# Patient Record
Sex: FEMALE | Race: BLACK OR AFRICAN AMERICAN | NOT HISPANIC OR LATINO | Employment: FULL TIME | URBAN - METROPOLITAN AREA
[De-identification: names, ages, dates, MRNs, and addresses within clinical notes are randomized per-mention and may not be internally consistent; named-entity substitution may affect disease eponyms.]

---

## 2023-08-23 ENCOUNTER — HOSPITAL ENCOUNTER (OUTPATIENT)
Dept: DATA CONVERSION | Facility: HOSPITAL | Age: 23
Discharge: HOME | End: 2023-08-23

## 2023-08-23 DIAGNOSIS — N76.0 ACUTE VAGINITIS: ICD-10-CM

## 2023-08-24 LAB
C GLABRATA DNA VAG QL NAA+PROBE: NEGATIVE
C KRUSEI DNA VAG QL NAA+PROBE: NEGATIVE
CANDIDA DNA VAG QL PROBE+SIG AMP: NEGATIVE
T VAGINALIS DNA VAG QL PROBE+SIG AMP: NEGATIVE
VAGINOSIS/ITIS DNA PNL VAG PROBE+SIG AMP: NEGATIVE

## 2023-09-25 ENCOUNTER — HOSPITAL ENCOUNTER (OUTPATIENT)
Dept: DATA CONVERSION | Facility: HOSPITAL | Age: 23
Discharge: HOME | End: 2023-09-25

## 2023-09-25 DIAGNOSIS — Z01.419 ENCOUNTER FOR GYNECOLOGICAL EXAMINATION (GENERAL) (ROUTINE) WITHOUT ABNORMAL FINDINGS: ICD-10-CM

## 2023-09-26 ENCOUNTER — HOSPITAL ENCOUNTER (OUTPATIENT)
Dept: DATA CONVERSION | Facility: HOSPITAL | Age: 23
Discharge: HOME | End: 2023-09-26

## 2023-09-26 DIAGNOSIS — R31.9 HEMATURIA, UNSPECIFIED: ICD-10-CM

## 2023-09-26 LAB
BACTERIA SPEC CULT: NORMAL
CC # UR: NORMAL /UL
REPORT STATUS -LH SQ DATA CONVERSION: NORMAL
SERVICE CMNT-IMP: NORMAL
SPECIMEN SOURCE: NORMAL

## 2023-10-04 ENCOUNTER — TELEPHONE (OUTPATIENT)
Dept: PRIMARY CARE | Facility: CLINIC | Age: 23
End: 2023-10-04

## 2023-10-04 NOTE — TELEPHONE ENCOUNTER
Pt needs tdap testing done for school, okay to schedule with MA?  Pt also needs bloodwork records   [phone]

## 2023-10-04 NOTE — TELEPHONE ENCOUNTER
Spoke with patient at this time, she has not had her rubella titer drawn, she will do so at this time. Also per last note from [facility] patient OK for tdap booster on [facility] schedule. Patient states will call back to schedule boosters after her titer level is resulted.

## 2024-01-09 ENCOUNTER — LAB REQUISITION (OUTPATIENT)
Dept: LAB | Facility: HOSPITAL | Age: 24
End: 2024-01-09
Payer: MEDICAID

## 2024-01-09 DIAGNOSIS — R39.9 UNSPECIFIED SYMPTOMS AND SIGNS INVOLVING THE GENITOURINARY SYSTEM: ICD-10-CM

## 2024-01-09 PROCEDURE — 87086 URINE CULTURE/COLONY COUNT: CPT

## 2024-01-11 LAB — BACTERIA UR CULT: ABNORMAL

## 2024-01-29 ENCOUNTER — OFFICE VISIT (OUTPATIENT)
Dept: PRIMARY CARE | Facility: CLINIC | Age: 24
End: 2024-01-29
Payer: MEDICAID

## 2024-01-29 VITALS
SYSTOLIC BLOOD PRESSURE: 122 MMHG | HEIGHT: 66 IN | DIASTOLIC BLOOD PRESSURE: 78 MMHG | TEMPERATURE: 97.3 F | OXYGEN SATURATION: 98 % | BODY MASS INDEX: 30.53 KG/M2 | WEIGHT: 190 LBS | HEART RATE: 92 BPM

## 2024-01-29 DIAGNOSIS — L50.8 OTHER URTICARIA: Primary | ICD-10-CM

## 2024-01-29 PROCEDURE — 1036F TOBACCO NON-USER: CPT | Performed by: NURSE PRACTITIONER

## 2024-01-29 PROCEDURE — 99213 OFFICE O/P EST LOW 20 MIN: CPT | Performed by: NURSE PRACTITIONER

## 2024-01-29 RX ORDER — METHYLPREDNISOLONE 4 MG/1
TABLET ORAL
Qty: 21 TABLET | Refills: 0 | Status: SHIPPED | OUTPATIENT
Start: 2024-01-29 | End: 2024-02-05

## 2024-01-29 ASSESSMENT — PAIN SCALES - GENERAL: PAINLEVEL: 0-NO PAIN

## 2024-01-29 ASSESSMENT — PATIENT HEALTH QUESTIONNAIRE - PHQ9
1. LITTLE INTEREST OR PLEASURE IN DOING THINGS: NOT AT ALL
SUM OF ALL RESPONSES TO PHQ9 QUESTIONS 1 AND 2: 0
2. FEELING DOWN, DEPRESSED OR HOPELESS: NOT AT ALL

## 2024-01-29 NOTE — PROGRESS NOTES
"Subjective   Patient ID: Landy Dubois is a 23 y.o. female who presents for Hives (X 5 days).    HPI   Pt has had hives and itching to entire body  that began 4 days ago.   Did not feel well last week with sore throat/congestion.   Home COVID19 tests were negative.   No new soaps/lotions/detergents  No new medications  No medications at this time  LMP 1/22/24    Review of Systems  ROS negative with exceptions above    Objective   /78 (BP Location: Left arm)   Pulse 92   Temp 36.3 °C (97.3 °F) (Temporal)   Ht 1.676 m (5' 6\")   Wt 86.2 kg (190 lb)   SpO2 98%   BMI 30.67 kg/m²     Physical Exam  Alert and oriented x 3, no acute distress  HEENT normal  Neck supple without lymphadenopathy  Lungs clear to auscultation bilaterally  Heart with regular rate and rhythm  Skin warm and dry. Scattered papules with surrounding redness to trunk and extremities. No drainage, crusting. Pt itching.   Neuro grossly intact    Assessment/Plan   Diagnoses and all orders for this visit:  Other urticaria  -     methylPREDNISolone (Medrol Dospak) 4 mg tablets; Take as directed on package.  Differentials discussed  Likely urticaria after viral illness based on symptomology  Meds as discussed  Ice packs, cool compresses for itching  Follow up INB 7 days     "

## 2024-03-12 ENCOUNTER — TELEPHONE (OUTPATIENT)
Dept: PRIMARY CARE | Facility: CLINIC | Age: 24
End: 2024-03-12
Payer: MEDICAID

## 2024-03-12 NOTE — TELEPHONE ENCOUNTER
PT THINKS SHE HAS A TOOTH INFECTION AND SAID KASSANDRA HAS CALLED IN [facility] FOR HER BEFORE.  SHE DID SEE A DENTIST TODAY, THEY DID NOT GIVE HER ANYTHING.  SHE WANTS KASSANDRA TO CALL HER IN SOMETHING . OMERO LEVINE

## 2024-05-17 ENCOUNTER — TELEPHONE (OUTPATIENT)
Dept: PRIMARY CARE | Facility: CLINIC | Age: 24
End: 2024-05-17
Payer: MEDICAID

## 2024-06-06 ENCOUNTER — OFFICE VISIT (OUTPATIENT)
Dept: PRIMARY CARE | Facility: CLINIC | Age: 24
End: 2024-06-06
Payer: MEDICAID

## 2024-06-06 VITALS
BODY MASS INDEX: 31.31 KG/M2 | SYSTOLIC BLOOD PRESSURE: 124 MMHG | OXYGEN SATURATION: 99 % | WEIGHT: 194 LBS | DIASTOLIC BLOOD PRESSURE: 80 MMHG | TEMPERATURE: 98.3 F | HEART RATE: 88 BPM

## 2024-06-06 DIAGNOSIS — N89.8 VAGINAL IRRITATION: ICD-10-CM

## 2024-06-06 LAB
POC APPEARANCE, URINE: CLEAR
POC BILIRUBIN, URINE: NEGATIVE
POC BLOOD, URINE: ABNORMAL
POC COLOR, URINE: YELLOW
POC GLUCOSE, URINE: NEGATIVE MG/DL
POC KETONES, URINE: NEGATIVE MG/DL
POC LEUKOCYTES, URINE: NEGATIVE
POC NITRITE,URINE: NEGATIVE
POC PH, URINE: 6.5 PH
POC PROTEIN, URINE: NEGATIVE MG/DL
POC SPECIFIC GRAVITY, URINE: <=1.005
POC UROBILINOGEN, URINE: 0.2 EU/DL

## 2024-06-06 PROCEDURE — 81003 URINALYSIS AUTO W/O SCOPE: CPT | Performed by: NURSE PRACTITIONER

## 2024-06-06 PROCEDURE — 87491 CHLMYD TRACH DNA AMP PROBE: CPT

## 2024-06-06 PROCEDURE — 87529 HSV DNA AMP PROBE: CPT

## 2024-06-06 PROCEDURE — 87205 SMEAR GRAM STAIN: CPT

## 2024-06-06 PROCEDURE — 99213 OFFICE O/P EST LOW 20 MIN: CPT | Performed by: NURSE PRACTITIONER

## 2024-06-06 PROCEDURE — 87591 N.GONORRHOEAE DNA AMP PROB: CPT

## 2024-06-06 RX ORDER — LAMOTRIGINE 25 MG/1
25 TABLET ORAL DAILY
COMMUNITY
Start: 2024-06-03

## 2024-06-06 RX ORDER — BUPROPION HYDROCHLORIDE 300 MG/1
300 TABLET ORAL EVERY MORNING
COMMUNITY
Start: 2023-08-23

## 2024-06-06 ASSESSMENT — PAIN SCALES - GENERAL: PAINLEVEL: 2

## 2024-06-06 NOTE — PROGRESS NOTES
Subjective   Patient ID: Landy Dubois is a 24 y.o. female who presents for STD testing (Red bumps on vagina, itching, and painful. ).    HPI   Landy is a 23 yo F who complains of bumps in vaginal area. She is concerned she could have an STI based on troubles in her marriage. She does shave that area and gets ingrown hairs which she plucks.   She states area is itchy and painful. No otc used    Review of Systems   Constitutional: Negative.    Gastrointestinal: Negative.    Genitourinary:  Positive for genital sores. Negative for decreased urine volume, difficulty urinating, dysuria, frequency, hematuria, pelvic pain, vaginal bleeding, vaginal discharge and vaginal pain.       Objective   /80   Pulse 88   Temp 36.8 °C (98.3 °F)   Wt 88 kg (194 lb)   LMP 06/03/2024 (Exact Date)   SpO2 99%   BMI 31.31 kg/m²     Physical Exam  Alert and oriented x 3, no acute distress  Lungs clear to auscultation bilaterally  Heart with regular rate and rhythm  ABD soft, nontender, nondistended without masses.  No CVAT  Patient with small red papules to mons pubis and left labia majora.  There is no crusting or drainage.  Introitus normal without discharge.  Vaginal vault clear.  No CMT.  No adnexal tenderness or masses.  Skin warm and dry without rash neuro grossly    Assessment/Plan   Diagnoses and all orders for this visit:  Vaginal irritation  -     POCT UA Automated manually resulted  -     C. Trachomatis / N. Gonorrhoeae, Amplified Detection; Future  -     Vaginitis Gram Stain For Bacterial Vaginosis + Yeast  -     HSV PCR  Acute-needs better control  Urine dip normal  Due to complaints and STI concerns, we will do a GC and chlamydia check, vaginitis screen and viral culture.  Patient is encouraged vaginal hygiene  Avoid vaginal irritants  Await results.  Differentials include folliculitis, STI and lower EXTR  Advised to avoid shaving area at this time.  Follow-up based on results

## 2024-06-07 LAB
CLUE CELLS VAG LPF-#/AREA: NORMAL /[LPF]
HSV1 DNA SKIN QL NAA+PROBE: NOT DETECTED
HSV2 DNA SKIN QL NAA+PROBE: NOT DETECTED
NUGENT SCORE: 2
YEAST VAG WET PREP-#/AREA: NORMAL

## 2024-06-08 LAB
C TRACH RRNA SPEC QL NAA+PROBE: NEGATIVE
N GONORRHOEA DNA SPEC QL PROBE+SIG AMP: NEGATIVE

## 2024-06-10 DIAGNOSIS — L73.9 FOLLICULITIS: Primary | ICD-10-CM

## 2024-06-10 RX ORDER — SULFAMETHOXAZOLE AND TRIMETHOPRIM 800; 160 MG/1; MG/1
1 TABLET ORAL 2 TIMES DAILY
Qty: 14 TABLET | Refills: 0 | Status: SHIPPED | OUTPATIENT
Start: 2024-06-10 | End: 2024-06-17

## 2024-06-15 ASSESSMENT — ENCOUNTER SYMPTOMS
CONSTITUTIONAL NEGATIVE: 1
DIFFICULTY URINATING: 0
HEMATURIA: 0
GASTROINTESTINAL NEGATIVE: 1
DYSURIA: 0
FREQUENCY: 0

## 2024-07-02 PROBLEM — E66.9 OBESITY WITH BODY MASS INDEX 30 OR GREATER: Status: ACTIVE | Noted: 2024-07-02

## 2024-07-02 PROBLEM — M22.2X1 PATELLOFEMORAL DISORDERS, RIGHT KNEE: Status: ACTIVE | Noted: 2024-07-02

## 2024-07-02 PROBLEM — F43.21 ADJUSTMENT DISORDER WITH DEPRESSED MOOD: Status: ACTIVE | Noted: 2021-05-25

## 2024-07-02 PROBLEM — K60.2 ANAL FISSURE: Status: RESOLVED | Noted: 2024-07-02 | Resolved: 2024-07-02

## 2024-07-02 PROBLEM — R42 VERTIGO: Status: ACTIVE | Noted: 2024-07-02

## 2024-07-02 PROBLEM — M22.2X2 PATELLOFEMORAL DISORDER OF LEFT KNEE: Status: ACTIVE | Noted: 2024-07-02

## 2024-07-02 PROBLEM — G43.909 MIGRAINES: Status: ACTIVE | Noted: 2024-07-02

## 2024-07-02 PROBLEM — E04.1 NODULAR THYROID DISEASE: Status: ACTIVE | Noted: 2024-07-02

## 2024-07-02 PROBLEM — K12.0 RECURRENT APHTHOUS ULCER: Status: ACTIVE | Noted: 2022-07-28

## 2024-07-02 PROBLEM — O60.00 PREMATURE LABOR (HHS-HCC): Status: RESOLVED | Noted: 2024-07-02 | Resolved: 2024-07-02

## 2024-07-02 PROBLEM — F33.2 MDD (MAJOR DEPRESSIVE DISORDER), RECURRENT SEVERE, WITHOUT PSYCHOSIS (MULTI): Chronic | Status: ACTIVE | Noted: 2022-12-13

## 2024-07-02 PROBLEM — R56.9 SEIZURE (MULTI): Status: RESOLVED | Noted: 2024-07-02 | Resolved: 2024-07-02

## 2024-07-02 PROBLEM — Q66.6 VALGUS DEFORMITY OF BOTH FEET: Status: ACTIVE | Noted: 2024-07-02

## 2024-07-02 PROBLEM — E55.9 VITAMIN D DEFICIENCY: Status: ACTIVE | Noted: 2023-06-16

## 2024-07-02 PROBLEM — F41.8 SITUATIONAL ANXIETY: Status: ACTIVE | Noted: 2023-07-01

## 2024-07-02 PROBLEM — R41.82 ALTERED MENTAL STATUS: Status: RESOLVED | Noted: 2024-07-02 | Resolved: 2024-07-02

## 2024-07-03 ENCOUNTER — APPOINTMENT (OUTPATIENT)
Dept: PRIMARY CARE | Facility: CLINIC | Age: 24
End: 2024-07-03
Payer: MEDICAID

## 2024-07-03 VITALS
WEIGHT: 191 LBS | SYSTOLIC BLOOD PRESSURE: 110 MMHG | OXYGEN SATURATION: 98 % | DIASTOLIC BLOOD PRESSURE: 70 MMHG | BODY MASS INDEX: 30.7 KG/M2 | TEMPERATURE: 98 F | HEIGHT: 66 IN | HEART RATE: 105 BPM

## 2024-07-03 DIAGNOSIS — Z00.00 ROUTINE GENERAL MEDICAL EXAMINATION AT A HEALTH CARE FACILITY: ICD-10-CM

## 2024-07-03 PROCEDURE — 99395 PREV VISIT EST AGE 18-39: CPT | Performed by: NURSE PRACTITIONER

## 2024-07-03 ASSESSMENT — PAIN SCALES - GENERAL: PAINLEVEL: 6

## 2024-07-03 NOTE — PROGRESS NOTES
"Subjective   Patient ID: Landy Dubois is a 24 y.o. female who presents for Annual Exam (Also c/o ongoing vaginal irritation-states medication she was prescribed did not help).    TABATHA Bill is a 25 yo F who presents for CPE  PMH/SMH/FMH reviewed and updated    Diet is improving, watching portions and carbs  Exercises daily  On semaglutide  Weight is down 14 lbs  Vapes  - started 2/24     GYN Dr Negro 12/23  Pap test done    Immunizations reviewed    Review of Systems  Constitutional Symptoms: negative for fever, loss of appetite, headaches, fatigue.   Eyes: negative for loss and blurring of vision, double vision.   Ear, Nose, Mouth, Throat: negative for hearing loss, tinnitus, nasal congestion, rhinorrhea, nose bleeds, teeth problems, mouth sores, gum disease, dysphagia, sore throat.   Cardiovascular: negative for chest pain/pressure, palpitations, edema, claudication.   Respiratory: negative for shortness of breath, dyspnea on exertion, pain with breathing, coughing.   Breast: negative for tenderness, masses, gynecomastia.   Gastrointestinal: negative for anorexia, indigestion, nausea, vomiting, abdominal pain, change in bowel habits, diarrhea, constipation, hematochezia, melena, blood in stool.    : Negative for urinary or genital complaints  Musculoskeletal: negative for joint pain, joint swelling, myalgia, cramps.   Integumentary: negative for change in mole, skin trouble or rash.   Neurological: negative for headache, numbness, tingling, weakness, tremors.   Psychiatric: negative for depression, anxiety.   Endocrine: negative for weight gain, heat or cold intolerance, polyuria, polydipsia, polyphagia.   Hematologic/Lymphatic: negative for bruising, abnormal bleeding, swollen glands     Objective   /70 (BP Location: Left arm, Patient Position: Sitting)   Pulse 105   Temp 36.7 °C (98 °F) (Temporal)   Ht 1.664 m (5' 5.5\")   Wt 86.6 kg (191 lb)   LMP 06/03/2024 (Exact Date)   SpO2 98%   BMI " 31.30 kg/m²     Physical Exam  General Appearance: Comfortable. She is well nourished, and well developed. She is awake, alert, and oriented and appears her stated age. The patient is cooperative with exam.  Head: Hair pattern reveals a normal pattern for patient's age and The face shows no abnormalities.  Eyes: PERRLA, EOMI, conjunctiva and sclera clear. Extraocular muscle exam reveals EOMI.  Ears, Nose, Mouth, Throat: Bilateral canals are normal. Both tympanic membranes are pearly gray and landmarks normal.    Nasal mucosa in both nostrils reveals no polyps, ulcerations, or lesions. Oral mucosa reveals no abnormalities and Teeth reveal good repair.  Neck: Neck reveals supple, no adenopathy, no thyromegaly, or carotid bruits.  Chest: Lungs are clear to auscultation bilaterally with no wheezes, rales, or rhonchi.  Cardiovascular: RRR without MRG. No edema  Breast: gyn  Abdomen: Abdomen is soft, NT, ND with no masses.  Genitourinary: gyn  Musculoskeletal: 5/5 and equal strength in bilateral upper and lower extremities.  Skin: Skin reveals good turgor and no rashes.  Neurological: Intact and non-focal. Cranial nerves II - XII are grossly intact.  Psychiatric: Patient has appropriate judgement. Patient has good insight. Patient's mood is appropriate.     Assessment/Plan   Diagnoses and all orders for this visit:  Routine general medical examination at a health care facility  25 yo F well exam  Preventative screenings reviewed  Immunizations reviewed  Mediterranean diet, exercise, safety  Follow up 1 year CPE

## 2024-08-23 ENCOUNTER — OFFICE VISIT (OUTPATIENT)
Dept: PRIMARY CARE | Facility: CLINIC | Age: 24
End: 2024-08-23
Payer: MEDICAID

## 2024-08-23 VITALS
HEART RATE: 96 BPM | SYSTOLIC BLOOD PRESSURE: 104 MMHG | WEIGHT: 186.6 LBS | DIASTOLIC BLOOD PRESSURE: 60 MMHG | BODY MASS INDEX: 30.58 KG/M2 | OXYGEN SATURATION: 99 %

## 2024-08-23 DIAGNOSIS — L73.2 HIDRADENITIS SUPPURATIVA OF LEFT AXILLA: Primary | ICD-10-CM

## 2024-08-23 PROCEDURE — 1036F TOBACCO NON-USER: CPT

## 2024-08-23 PROCEDURE — 99213 OFFICE O/P EST LOW 20 MIN: CPT

## 2024-08-23 RX ORDER — ESCITALOPRAM OXALATE 10 MG/1
10 TABLET ORAL DAILY
COMMUNITY

## 2024-08-23 RX ORDER — SULFAMETHOXAZOLE AND TRIMETHOPRIM 800; 160 MG/1; MG/1
1 TABLET ORAL 2 TIMES DAILY
Qty: 14 TABLET | Refills: 0 | Status: SHIPPED | OUTPATIENT
Start: 2024-08-23 | End: 2024-08-30

## 2024-08-23 ASSESSMENT — PATIENT HEALTH QUESTIONNAIRE - PHQ9
2. FEELING DOWN, DEPRESSED OR HOPELESS: NOT AT ALL
1. LITTLE INTEREST OR PLEASURE IN DOING THINGS: NOT AT ALL
SUM OF ALL RESPONSES TO PHQ9 QUESTIONS 1 AND 2: 0

## 2024-08-23 ASSESSMENT — PAIN SCALES - GENERAL: PAINLEVEL: 0-NO PAIN

## 2024-08-23 NOTE — PROGRESS NOTES
Subjective     Patient ID: Landy Dubois is a 24 y.o. female who presents for Follow-up (Boil under left armpit x 2 days. Had it lanced before. Reports pain and yellow-red exudate coming out ).      HPI    Landy presents for concerns of boil.mass noted to her left axilla x 2 days.  Area is becoming increasingly swollen and painful to touch. No drainage this episode. She admits that this is a recurrent issue.  She has encountered boil/masses in this area before which have required lancing and antibiotics.  She also admits that she experiences these same areas in her groin periodically.     Patient's recent visit notes, medication and allergy lists, past medical surgical social hx, immunization, vitals, problem list, recent tests were reviewed by me for pertinence to this visit.    Current Outpatient Medications:     buPROPion XL (Wellbutrin XL) 300 mg 24 hr tablet, Take 1 tablet (300 mg) by mouth once daily in the morning., Disp: , Rfl:     escitalopram (Lexapro) 10 mg tablet, Take 1 tablet (10 mg) by mouth once daily., Disp: , Rfl:     semaglutide (OZEMPIC) 1 mg/dose (4 mg/3 mL) pen injector, Inject 1 mg under the skin every 7 days., Disp: , Rfl:         Review of Systems  All other systems have been reviewed and are negative except as noted in the HPI.         Objective   /60 (BP Location: Left arm)   Pulse 96   Wt 84.6 kg (186 lb 9.6 oz)   SpO2 99%   BMI 30.58 kg/m²       Physical Exam  Vitals and nursing note reviewed.   Constitutional:       General: She is not in acute distress.     Appearance: Normal appearance.   Skin:     General: Skin is warm and dry.      Findings: Abscess (abscess noted to center of left axilla, slightly raised, inderated, erythematous, warm to touch, painful to touch. Skin intact.) present.   Neurological:      Mental Status: She is alert.   Psychiatric:         Behavior: Behavior is cooperative.             Assessment & Plan  Hidradenitis suppurativa of left axilla  Chronic  problem, recurrent  Begin oral antibiotics, Bactrim DS BID x7 days as discussed.  Warm compresses multiple times per day.  Avoid any deodorant for now.   Discussed etiology of hidradenitis suppurativa and life style interventions to reduce flairs.  Referral to [place] [facility] for further evaluation if I & D necessary.   Orders:    sulfamethoxazole-trimethoprim (Bactrim DS) 800-160 mg tablet; Take 1 tablet by mouth 2 times a day for 7 days.    Referral to Dermatology          Patient understands and agrees with treatment plan.    Inge Montes, APRN-CNP

## 2024-08-29 ENCOUNTER — TELEPHONE (OUTPATIENT)
Dept: PRIMARY CARE | Facility: CLINIC | Age: 24
End: 2024-08-29
Payer: MEDICAID

## 2024-08-29 NOTE — TELEPHONE ENCOUNTER
----- Message from Akiko BEAULIEU sent at 8/29/2024  1:34 PM EDT -----  Regarding: Insurance not accepted  [employer] does not accept her insurance.  Please call the patient to set up another referral.

## 2024-08-30 DIAGNOSIS — L73.2 HIDRADENITIS SUPPURATIVA OF LEFT AXILLA: Primary | ICD-10-CM

## 2024-09-30 ENCOUNTER — TELEPHONE (OUTPATIENT)
Dept: PRIMARY CARE | Facility: CLINIC | Age: 24
End: 2024-09-30

## 2024-09-30 ENCOUNTER — CLINICAL SUPPORT (OUTPATIENT)
Dept: PRIMARY CARE | Facility: CLINIC | Age: 24
End: 2024-09-30
Payer: MEDICAID

## 2024-09-30 VITALS
HEIGHT: 66 IN | DIASTOLIC BLOOD PRESSURE: 68 MMHG | OXYGEN SATURATION: 97 % | HEART RATE: 93 BPM | BODY MASS INDEX: 29.89 KG/M2 | TEMPERATURE: 98.8 F | WEIGHT: 186 LBS | SYSTOLIC BLOOD PRESSURE: 103 MMHG

## 2024-09-30 PROCEDURE — 90471 IMMUNIZATION ADMIN: CPT | Performed by: NURSE PRACTITIONER

## 2024-09-30 PROCEDURE — 90656 IIV3 VACC NO PRSV 0.5 ML IM: CPT | Performed by: NURSE PRACTITIONER

## 2024-10-21 ENCOUNTER — OFFICE VISIT (OUTPATIENT)
Dept: PRIMARY CARE | Facility: CLINIC | Age: 24
End: 2024-10-21
Payer: MEDICAID

## 2024-10-21 VITALS
WEIGHT: 178 LBS | SYSTOLIC BLOOD PRESSURE: 106 MMHG | HEART RATE: 74 BPM | OXYGEN SATURATION: 98 % | BODY MASS INDEX: 29.66 KG/M2 | HEIGHT: 65 IN | DIASTOLIC BLOOD PRESSURE: 70 MMHG

## 2024-10-21 DIAGNOSIS — R35.0 URINE FREQUENCY: ICD-10-CM

## 2024-10-21 DIAGNOSIS — R10.9 ABDOMINAL PAIN, UNSPECIFIED ABDOMINAL LOCATION: ICD-10-CM

## 2024-10-21 DIAGNOSIS — K21.9 GASTROESOPHAGEAL REFLUX DISEASE, UNSPECIFIED WHETHER ESOPHAGITIS PRESENT: Primary | ICD-10-CM

## 2024-10-21 PROBLEM — K11.7 DISTURBANCES OF SALIVARY SECRETION: Status: ACTIVE | Noted: 2024-10-21

## 2024-10-21 LAB
POC APPEARANCE, URINE: CLEAR
POC BILIRUBIN, URINE: NEGATIVE
POC BLOOD, URINE: NEGATIVE
POC COLOR, URINE: NORMAL
POC GLUCOSE, URINE: NEGATIVE MG/DL
POC KETONES, URINE: NEGATIVE MG/DL
POC LEUKOCYTES, URINE: NEGATIVE
POC NITRITE,URINE: NEGATIVE
POC PH, URINE: 7 PH
POC PROTEIN, URINE: NEGATIVE MG/DL
POC SPECIFIC GRAVITY, URINE: 1.01
POC UROBILINOGEN, URINE: 0.2 EU/DL

## 2024-10-21 PROCEDURE — 3008F BODY MASS INDEX DOCD: CPT | Performed by: STUDENT IN AN ORGANIZED HEALTH CARE EDUCATION/TRAINING PROGRAM

## 2024-10-21 PROCEDURE — 81003 URINALYSIS AUTO W/O SCOPE: CPT | Performed by: STUDENT IN AN ORGANIZED HEALTH CARE EDUCATION/TRAINING PROGRAM

## 2024-10-21 PROCEDURE — 99214 OFFICE O/P EST MOD 30 MIN: CPT | Performed by: STUDENT IN AN ORGANIZED HEALTH CARE EDUCATION/TRAINING PROGRAM

## 2024-10-21 RX ORDER — OMEPRAZOLE 20 MG/1
20 CAPSULE, DELAYED RELEASE ORAL DAILY
Qty: 30 CAPSULE | Refills: 1 | Status: SHIPPED | OUTPATIENT
Start: 2024-10-21 | End: 2024-12-20

## 2024-10-21 ASSESSMENT — COLUMBIA-SUICIDE SEVERITY RATING SCALE - C-SSRS: 1. IN THE PAST MONTH, HAVE YOU WISHED YOU WERE DEAD OR WISHED YOU COULD GO TO SLEEP AND NOT WAKE UP?: NO

## 2024-10-21 ASSESSMENT — PATIENT HEALTH QUESTIONNAIRE - PHQ9
1. LITTLE INTEREST OR PLEASURE IN DOING THINGS: NOT AT ALL
2. FEELING DOWN, DEPRESSED OR HOPELESS: NOT AT ALL
SUM OF ALL RESPONSES TO PHQ9 QUESTIONS 1 AND 2: 0

## 2024-10-21 NOTE — PROGRESS NOTES
"                                                                                                                 GENERAL PROGRESS NOTE    Chief Complaint   Patient presents with    Abdominal Pain     For two weeks after eating .        HPI  Landy Dubois is a 24 y.o. female that presents today for two week history of epigastric pain and cramping. Starts shortly after eating or drinking and lasts a couple of hours. Recently woke up in the AM and had burning and like something stuck in throat. Has had more burping lately, especially after eating. Sour taste in mouth. Symptoms feel better after passing gas.   Has been constipated lately, taking bisacodyl and senna. Did have BM today but was small. Does take Miralax daily since starting on semaglutide. Compound prescription, is on highest dose past 3 months. Has tolerated well to this point.     Vapes regularly.     Vital signs   /70   Pulse 74   Ht 1.651 m (5' 5\")   Wt 80.7 kg (178 lb)   SpO2 98%   BMI 29.62 kg/m²      Current Outpatient Medications   Medication Sig Dispense Refill    buPROPion XL (Wellbutrin XL) 300 mg 24 hr tablet Take 1 tablet (300 mg) by mouth once daily in the morning.      escitalopram (Lexapro) 10 mg tablet Take 1 tablet (10 mg) by mouth once daily.      semaglutide (OZEMPIC) 1 mg/dose (4 mg/3 mL) pen injector Inject 1 mg under the skin every 7 days.       No current facility-administered medications for this visit.       Review of Systems   Constitutional:  Negative for chills and fever.   HENT:  Negative for sore throat.    Respiratory:  Negative for shortness of breath and wheezing.    Cardiovascular:  Negative for chest pain.   Gastrointestinal:  Negative for blood in stool, constipation, diarrhea and nausea. Abdominal pain: see hpi.       Physical Exam  Constitutional:       Appearance: Normal appearance.   HENT:      Mouth/Throat:      Mouth: Mucous membranes are moist.      Pharynx: Oropharynx is clear. No oropharyngeal " exudate.   Cardiovascular:      Rate and Rhythm: Normal rate and regular rhythm.   Pulmonary:      Effort: Pulmonary effort is normal.      Breath sounds: Normal breath sounds.   Abdominal:      General: Bowel sounds are normal.      Palpations: Abdomen is soft.      Tenderness: There is no abdominal tenderness. There is no guarding or rebound.   Neurological:      Mental Status: She is alert.         Assessment/Plan   1. Gastroesophageal reflux disease, unspecified whether esophagitis present (Primary)  Symptoms consistent with GERD. On semaglutide which could be contributing. Start PPI. GERD precautions reviewed. Follow up with PCP 4 weeks.   - omeprazole (PriLOSEC) 20 mg DR capsule; Take 1 capsule (20 mg) by mouth once daily. Do not crush or chew.  Dispense: 30 capsule; Refill: 1    2. Urine frequency  Reported slight increase in urination on intake. No dysuria. UA negative.  - POCT UA Automated manually resulted      Problem List Items Addressed This Visit    None  Visit Diagnoses       Urine frequency        Relevant Orders    POCT UA Automated manually resulted (Completed)    Abdominal pain, unspecified abdominal location                 Orders Placed This Encounter   Procedures    POCT UA Automated manually resulted     Order Specific Question:   Release result to [facility]     Answer:   Immediate [1]        Elis Flores MD  10/21/24  3:47 PM

## 2024-10-29 ASSESSMENT — ENCOUNTER SYMPTOMS
FEVER: 0
BLOOD IN STOOL: 0
SHORTNESS OF BREATH: 0
NAUSEA: 0
SORE THROAT: 0
CONSTIPATION: 0
WHEEZING: 0
DIARRHEA: 0
CHILLS: 0

## 2024-11-20 ENCOUNTER — APPOINTMENT (OUTPATIENT)
Dept: PRIMARY CARE | Facility: CLINIC | Age: 24
End: 2024-11-20
Payer: MEDICAID

## 2025-02-20 ENCOUNTER — OFFICE VISIT (OUTPATIENT)
Dept: PRIMARY CARE | Facility: CLINIC | Age: 25
End: 2025-02-20
Payer: MEDICAID

## 2025-02-20 VITALS
OXYGEN SATURATION: 98 % | WEIGHT: 178 LBS | SYSTOLIC BLOOD PRESSURE: 110 MMHG | HEART RATE: 83 BPM | BODY MASS INDEX: 29.62 KG/M2 | DIASTOLIC BLOOD PRESSURE: 72 MMHG

## 2025-02-20 DIAGNOSIS — K59.00 CONSTIPATION, UNSPECIFIED CONSTIPATION TYPE: ICD-10-CM

## 2025-02-20 DIAGNOSIS — R10.13 EPIGASTRIC PAIN: Primary | ICD-10-CM

## 2025-02-20 PROCEDURE — 99213 OFFICE O/P EST LOW 20 MIN: CPT

## 2025-02-20 RX ORDER — OMEPRAZOLE 20 MG/1
20 CAPSULE, DELAYED RELEASE ORAL DAILY
Qty: 30 CAPSULE | Refills: 0 | Status: SHIPPED | OUTPATIENT
Start: 2025-02-20 | End: 2025-03-22

## 2025-02-20 RX ORDER — LISDEXAMFETAMINE DIMESYLATE 10 MG/1
30 CAPSULE ORAL DAILY
COMMUNITY

## 2025-02-20 ASSESSMENT — PAIN SCALES - GENERAL: PAINLEVEL_OUTOF10: 8

## 2025-02-20 NOTE — PROGRESS NOTES
Subjective   Patient ID: Landy Dubois is a 24 y.o. female who presents for Constipation (Believes it's a side effect of ozempic. Hasn't had a bowel movement in a week. Did a suppository 3 days and a little bowel movement did come out ) and Abdominal Pain (And cramping x 1 week ).    HPI   Landy is seen for c/o constipation and abdominal cramping x 1 week. Pain in epigastric area. Also had nausea but this is improving. One BM two days ago after using Bisacodyl suppository. Patient is taking Semaglutide, no recent dose increases. Was started on Vyvanse about 1 week ago, has not been eating much but thought this was because of constipation. Will sometimes go 2-3 days without BM. Trying to increase fluid intake. Denies vomiting, fever, chills, blood in stool, urinary symptoms.     Review of Systems  All other systems have been reviewed and are negative except as noted in the HPI.     Objective   /72 (BP Location: Left arm)   Pulse 83   Wt 80.7 kg (178 lb)   SpO2 98%   BMI 29.62 kg/m²     Physical Exam  Vitals and nursing note reviewed.   Constitutional:       General: She is not in acute distress.     Appearance: She is not toxic-appearing.   Eyes:      Extraocular Movements: Extraocular movements intact.      Conjunctiva/sclera: Conjunctivae normal.   Cardiovascular:      Rate and Rhythm: Normal rate.   Pulmonary:      Effort: Pulmonary effort is normal.   Abdominal:      General: Abdomen is flat. Bowel sounds are normal. There is no distension.      Palpations: Abdomen is soft.      Tenderness: There is abdominal tenderness in the epigastric area.   Musculoskeletal:      Cervical back: Neck supple.   Neurological:      General: No focal deficit present.      Mental Status: She is alert.   Psychiatric:         Mood and Affect: Mood normal.       Assessment/Plan   Assessment & Plan  Epigastric pain    Orders:    Amylase; Future    Lipase; Future    CBC and Auto Differential; Future    omeprazole (PriLOSEC)  20 mg DR capsule; Take 1 capsule (20 mg) by mouth once daily. Do not crush or chew.    Constipation, unspecified constipation type

## 2025-02-22 LAB
AMYLASE SERPL-CCNC: 34 U/L (ref 21–101)
BASOPHILS # BLD AUTO: 40 CELLS/UL (ref 0–200)
BASOPHILS NFR BLD AUTO: 1 %
EOSINOPHIL # BLD AUTO: 100 CELLS/UL (ref 15–500)
EOSINOPHIL NFR BLD AUTO: 2.5 %
ERYTHROCYTE [DISTWIDTH] IN BLOOD BY AUTOMATED COUNT: 11.9 % (ref 11–15)
HCT VFR BLD AUTO: 35.4 % (ref 35–45)
HGB BLD-MCNC: 11.8 G/DL (ref 11.7–15.5)
LIPASE SERPL-CCNC: 48 U/L (ref 7–60)
LYMPHOCYTES # BLD AUTO: 2080 CELLS/UL (ref 850–3900)
LYMPHOCYTES NFR BLD AUTO: 52 %
MCH RBC QN AUTO: 30.8 PG (ref 27–33)
MCHC RBC AUTO-ENTMCNC: 33.3 G/DL (ref 32–36)
MCV RBC AUTO: 92.4 FL (ref 80–100)
MONOCYTES # BLD AUTO: 228 CELLS/UL (ref 200–950)
MONOCYTES NFR BLD AUTO: 5.7 %
NEUTROPHILS # BLD AUTO: 1552 CELLS/UL (ref 1500–7800)
NEUTROPHILS NFR BLD AUTO: 38.8 %
PLATELET # BLD AUTO: 379 THOUSAND/UL (ref 140–400)
PMV BLD REES-ECKER: 10.4 FL (ref 7.5–12.5)
RBC # BLD AUTO: 3.83 MILLION/UL (ref 3.8–5.1)
WBC # BLD AUTO: 4 THOUSAND/UL (ref 3.8–10.8)